# Patient Record
Sex: MALE | Race: WHITE
[De-identification: names, ages, dates, MRNs, and addresses within clinical notes are randomized per-mention and may not be internally consistent; named-entity substitution may affect disease eponyms.]

---

## 2018-07-29 NOTE — DIAGNOSTIC IMAGING REPORT
EXAM: CHEST SINGLE (PORTABLE), AP 1 view

INDICATION: Shortness of breath

COMPARISON: None



FINDINGS:

LINES/TUBES: None



LUNGS: No consolidations or edema. 



PLEURA: No effusions or pneumothorax.



HEART AND MEDIASTINUM: Normal size and contour.



BONES AND SOFT TISSUES: No acute findings. 



IMPRESSION:

No acute thoracic abnormality.







Signed by: Dr. Brandie Conti M.D. on 7/29/2018 10:31 PM

## 2018-07-30 NOTE — PRE OP HISTORY & PHYSICAL
CHIEF COMPLAINT:  Dyspnea and congestion.  



HISTORY OF PRESENT ILLNESS:  Patient is a 63-year-old man.  He has a 

history of COPD and chronic bronchitis.  He uses bronchodilators at home.  

After traveling to work on his house in [place], he noted worsening 

congestion and dyspnea.  He had wheezing.  It was not relieved with 

nebulizers at home.  It was not relieved with prednisone. 



He subsequently came to the emergency department and received Solu-Medrol 

nebulizers.  He had some improvement in the wheezing but still complains of 

dyspnea.  



PAST MEDICAL HISTORY 

1. COPD.

2. Prior cardiac workup 4 years ago showed a normal echocardiogram and a 

normal stress test. 



PAST SURGICAL HISTORY:  History of prior knee surgery.



SOCIAL HISTORY:  The patient is not smoking at this time.  He is not a 

drinker.  



FAMILY HISTORY:  Family history is noncontributory.  



ALLERGIES:  THE PATIENT IS ALLERGIC TO SULFA. 



REVIEW OF SYSTEMS:  There was no fever.  He did not have headache or sinus 

congestion.  He has no sore throat.  There is no neck pain.  He did not 

have chest pain.  He had wheezing and cough as well as dyspnea.  There was 

no abdominal pain.  He had no nausea or vomiting.  He had no leg edema.  



PHYSICAL EXAMINATION 

VITAL SIGNS:  The patient is afebrile.  The vital signs are stable. 

HEENT:  Shows no facial swelling or erythema.  The nasal mucosa is normal.  

The oropharynx is normal.  

LYMPHATIC:  Shows no submandibular, cervical or supraclavicular adenopathy.

CARDIAC:  Reveals a regular rate and rhythm with a normal S1 and S2.  There 

are no murmurs or rubs.

LUNGS:  Auscultation of the lungs reveals prolonged expiratory phase with a 

few wheezes.  

ABDOMEN:  Soft and nontender.  There is no rebound or guarding.

EXTREMITIES:  Show no leg edema or calf tenderness.  There is no cyanosis 

or clubbing.  

SKIN:  Shows no rashes. 

NEUROLOGIC:  Shows no focal abnormalities.  



IMPRESSIONS

1. Chronic obstructive pulmonary disease with acute exacerbation.  

2. Dyspnea. 



PLANS

1. Solu-Medrol at 1 mg per kg along with bronchodilators. 

2. Cardiac evaluation to rule out any cardiac causes of dyspnea. 

3. CT scan of the chest with the pulmonary angiogram protocol to rule 

out PE.  



 









DD:  07/30/2018 08:19

DT:  07/30/2018 08:39

Job#:  [id number] LILI

## 2018-07-30 NOTE — CONSULTATION
DATE OF CONSULTATION:  July 30, 2018 



CARDIOLOGY CONSULTATION



ATTENDING PHYSICIAN:  Dr. Yash Huggins.



Thank you very much for asking me to see this nice man again in 

consultation.



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 

presented to the emergency room with a complaint of shortness of breath.



HISTORY OF PRESENT ILLNESS:  Patient reports he has been described as 

having bronchitis and COPD.  He found his shortness of breath was getting 

worse with cough and some sputum production.  He denies any chest pain or 

palpitations or ankle edema.



PAST MEDICAL HISTORY:  Significant for diagnosis of COPD in recent years.  

He is known to have hyperlipidemia and has had some gout attacks.



CURRENT MEDICATIONS:  Include Synthroid 150 mcg daily for hypothyroidism 

and Protonix.  He has used some steroids intermittently.  He has fish oil 

capsules and inhalers.  He has had previous ER visits for gout.



PERSONAL AND SOCIAL HISTORY:  The patient reports that he finally stopped 

smoking about 4 months ago.



FAMILY HISTORY:  Not relevant.



REVIEW OF SYSTEMS

CARDIAC:  Patient had cardiac evaluation in August 2015 with stress test, 

which was negative and echocardiogram, which showed mild LVH and EF about 

63%.

VASCULAR:  Patient reports he sometimes has some leg cramping.



PHYSICAL EXAMINATION

GENERAL:  At this time shows a pleasant and alert man, who is awake and 

responsive.

VITAL SIGNS:  Blood pressure 140/70.  He is wearing nasal cannula oxygen.

NECK:  No jugular venous distention.

THORAX:  Heart sounds S1 and S2 are equal.  No murmurs. 

LUNGS:  Have faint wheezing and rhonchi that change with cough.

ABDOMEN:  Normal bowel sounds.

EXTREMITIES:  No cyanosis, clubbing, or edema.  Distal pulses are reduced.



LABORATORY AND DIAGNOSTIC DATA:  EKG shows sinus rhythm with sinus 

tachycardia.  Troponins are negative x2.  Today, his glucose is 172.  His 

BNP is less than 10.



Chest x-ray does not show any infiltrates.



ASSESSMENT

1. Bronchitis/chronic obstructive pulmonary disease.

2. Hypothyroidism.

3. Rule out congestive heart failure, although his BNP is less than 10.

4. History of gout.

5. Rule out peripheral vascular disease.





PLAN:  Agree with current management and we will check echocardiogram and 

arterial Doppler scan of the legs and lipid profile.  Further management 

based on clinical course.



Thank you for asking me to see him in consultation.









DD:  07/30/2018 11:23

DT:  07/30/2018 11:49

Job#:  [id number]



cc:DR YASH HOUSTON

## 2018-07-30 NOTE — DIAGNOSTIC IMAGING REPORT
EXAM: CT Chest WITH contrast (PE Protocol)

INDICATION:      

\S\Evaluate for PE

\S\[id number]

\S\1350  

COMPARISON: Chest x-ray on 7/29/2018



TECHNIQUE:

Chest was scanned utilizing a multidetector helical scanner from the lung apex

through the level of the diaphragm after administration of IV contrast. Thin

section reconstructions were obtained with special concentration on the

pulmonary arteries. Coronal and sagittal reformations were obtained. Pulmonary

embolism protocol was performed.

     IV CONTRAST: 100 mL of Isovue-370



COMPLICATIONS: None



RADIATION DOSE: 

     Total DLP: 552.37 mGy*cm

     Estimated effective dose: (DLP x 0.014 x size factor) mSv

     CTDIvol has been reviewed. It is below the limits set by the Radiation

Protocol Committee (RPC).



FINDINGS:



LINES/ TUBES: None.



LUNGS AND AIRWAYS: Suboptimal opacification of the pulmonary arteries. Within

this context, there is no definite central filling defects visualized. Limited

for evaluation of segmental and subsegmental branches. 



3 mm lateral right middle lobe nodule (series 3, image 89).  Airways are

normal.



PLEURA: The pleural spaces are clear. Few focal areas of pleural thickening

versus subsegmental atelectasis, for example in left upper lobe (series 2,

image 31) or posterior right upper lobe (2/34).



HEART AND MEDIASTINUM: The thyroid gland is normal.  No mediastinal, hilar or

axillary lymphadenopathy.  The heart is normal in size.. There is no

pericardial effusion.     .  Main pulmonary artery measures     cm in diameter

and the ascending aorta measures     cm.



UPPER ABDOMEN: Multiple hepatic hypodensities, cannot be characterized on this

single phase study.



BONES: Unremarkable. Degenerative changes of spine.



SOFT TISSUES: Unremarkable.



IMPRESSION: 

1.  Suboptimal opacification of the pulmonary arteries. Within this context,

there is no definite central filling defects visualized. Limited for evaluation

of segmental and subsegmental branches. 

2.  Hepatic hypodensities, which cannot be characterized with certainty on this

single phase study. If clinically indicated, nonurgent right upper quadrant

ultrasound can be obtained for further characterization.

3.  3 mm nonspecific right middle lobe lung nodule. Without risk factors, no

follow-up is necessary. With risk factors, follow-up with low dose chest CT in

one year is recommended.



Signed by: Dr. Stoney Feliz MD on 7/30/2018 2:43 PM

## 2018-09-28 NOTE — HISTORY AND PHYSICAL
CHIEF COMPLAINT:  Worsening dyspnea and wheezing. 



HISTORY OF PRESENT ILLNESS:  The patient is a 63-year-old man with a known 

diagnosis of chronic obstructive pulmonary disease. He uses Spiriva as well 

as nebulizers at home. Apparently they were doing some work in his house 

and removed some tiles. He had difficulty breathing because of the 

increased dust. He noticed worsening wheezing and cough. His cough was 

productive of some discolored phlegm. 



PAST MEDICAL HISTORY

1. COPD.

2. Negative cardiac evaluation.



PAST SURGICAL HISTORY:  Prior knee surgery.



SOCIAL HISTORY:  The patient smoked previously many years ago but is not 

smoking at this time. He is not a drinker.



FAMILY HISTORY:  Family history is noncontributory.



ALLERGIES:  SULFA MEDICATIONS.



REVIEW OF SYSTEMS:  There is no fever. He has no headache. He has no sore 

throat or neck pain. He does not complain of chest pain. He does have 

dyspnea and wheezing. He notes a cough productive of some discolored 

phlegm. 



PHYSICAL EXAMINATION

VITAL SIGNS:  The patient is afebrile. The blood pressure is 153/77, and 

the pulse is 94. The O2 saturation is 93% on 2 liters.

HEENT:  Examination shows no facial swelling or erythema. The nasal mucosa 

is normal. The oropharynx is normal. 

LYMPHATIC:  Examination shows no submandibular, cervical or supraclavicular 

adenopathy.

NECK:  Examination shows no JVD or thyromegaly. There is no nuchal 

rigidity.

CARDIAC:  Exam reveals a regular rate and rhythm with a normal S1 and S2. 

There are no murmurs or rubs.

LUNGS:  Auscultation reveals wheezing in both lung fields. There is a 

prolonged expiratory phase. 

ABDOMEN:  Soft and nontender. There is no rebound or guarding.

EXTREMITIES:  Examination shows no leg edema or calf tenderness. 



RADIOGRAPHIC DATA:  Chest x-ray shows no active disease. 



IMPRESSION

1. Chronic obstructive pulmonary disease with acute exacerbation.

2. Hypothyroidism.



PLAN

1. Solu-Medrol at 1 mg/kg IV daily.

2. Doxycycline twice daily.

3. Bronchodilators.

4. Alpha-1 antitrypsin level.

5. Out of bed as tolerated.









DD:  09/28/2018 13:32

DT:  09/28/2018 13:56

Job#:  [id number]

## 2018-09-28 NOTE — DIAGNOSTIC IMAGING REPORT
PROCEDURE: CHEST SINGLE (PORTABLE)

COMPARISON: None.

INDICATIONS: COUGH,SHORTNESS OF BREATH

 

FINDINGS:



LUNGS: The lungs are hyperexpanded. No consolidations or edema.

 

PLEURA: No effusions or pneumothorax.

 

HEART \T\ 

MEDIASTINUM: The heart is within normal size-limits.

 

BONES \T\

SOFT TISSUES:  No acute findings.

 

 

CONCLUSION:

No acute thoracic abnormality. 

 

 

 

Cameron Chacko D.O.  

Dictated by:  Cameron Chacko D.O. on 9/28/2018 at 9:55     

Electronically approved by:  Cameron Chacko D.O. on 9/28/2018 at 9:55

## 2018-10-01 NOTE — DISCHARGE SUMMARY
DISCHARGE DIAGNOSES

1. Chronic obstructive pulmonary disease with acute exacerbation. 

2. Hypothyroidism.

3. Oral thrush. 



RADIOGRAPHIC DATA:  Chest x-ray shows no active disease. 



HISTORY OF PRESENT ILLNESS:  The patient is a 63-year-old man with a 

history of moderate obstructive lung disease.  He uses Spiriva at home as 

well as nebulizers.  He started having more problems breathing after he was 

removed to the floor in his house.  He noticed increasing coughing and 

wheezing.  It was not relieved with nebulizer.



HOSPITAL COURSE:  Patient was admitted.  The patient was started on IV 

Solu-Medrol along with antibiotics.  Patient also received aggressive 

bronchodilators.  Patient had some gradual improvement with this.  After 2 

to 3 days he felt much better and was eager to go home.

The patient is also experiencing some oral thrush that started on the day 

of discharge.  He does not report any acid reflux or heartburn.  



DISPOSITION:  The patient is discharged home and will follow up with Carlota Huggins and Johan.  

 



 _________________________________

RIZWAN HUGGINS MD



DD:  10/01/2018 12:19

DT:  10/01/2018 15:33

Job#:  [id number]



cc:ZAFAR HOUSTON MD

## 2019-02-20 ENCOUNTER — HOSPITAL ENCOUNTER (EMERGENCY)
Dept: HOSPITAL 88 - FSED | Age: 64
Discharge: HOME | End: 2019-02-20
Payer: COMMERCIAL

## 2019-02-20 VITALS — WEIGHT: 228 LBS | BODY MASS INDEX: 28.35 KG/M2 | HEIGHT: 75 IN

## 2019-02-20 VITALS — SYSTOLIC BLOOD PRESSURE: 155 MMHG | DIASTOLIC BLOOD PRESSURE: 94 MMHG

## 2019-02-20 DIAGNOSIS — Z87.891: ICD-10-CM

## 2019-02-20 DIAGNOSIS — R06.09: Primary | ICD-10-CM

## 2019-02-20 DIAGNOSIS — R05: ICD-10-CM

## 2019-02-20 DIAGNOSIS — J44.1: ICD-10-CM

## 2019-02-20 PROCEDURE — 80053 COMPREHEN METABOLIC PANEL: CPT

## 2019-02-20 PROCEDURE — 84484 ASSAY OF TROPONIN QUANT: CPT

## 2019-02-20 PROCEDURE — 85025 COMPLETE CBC W/AUTO DIFF WBC: CPT

## 2019-02-20 PROCEDURE — 87400 INFLUENZA A/B EACH AG IA: CPT

## 2019-02-20 PROCEDURE — 93005 ELECTROCARDIOGRAM TRACING: CPT

## 2019-02-20 PROCEDURE — 71046 X-RAY EXAM CHEST 2 VIEWS: CPT

## 2019-02-20 PROCEDURE — 99284 EMERGENCY DEPT VISIT MOD MDM: CPT

## 2019-02-20 NOTE — DIAGNOSTIC IMAGING REPORT
EXAM: CXR 2 VIEW - HOPD, PA and lateral

DATE: 2/20/2019 Time stamp on exam: 10:53 AM

INDICATION: Congestion

COMPARISON: None



FINDINGS:

LINES/TUBES: None



LUNGS: No consolidations or edema. 



PLEURA: No effusions or pneumothorax.



HEART AND MEDIASTINUM: Normal size and contour. Tortuous thoracic aorta. 



BONES AND SOFT TISSUES: No acute findings with mild degenerative changes of the

spine. 



IMPRESSION:

No acute thoracic abnormality.











Signed by: Dr. Cameron Chacko DO on 2/20/2019 11:10 AM

## 2019-02-20 NOTE — XMS REPORT
[facility] - Continuity of Care Document

                             Created on: 2019



LOLA PAYNE

External Reference #: [id number]

: 1955

Sex: Male



Demographics







                          Address                   [address]

[place]  [address]

 

                          Home Phone                [phone]

 

                          Preferred Language        English

 

                          Marital Status            

 

                          Jew Affiliation     Unknown

 

                          Race                      White

 

                          Ethnic Group              Non-





Author







                          Author                    [facility]

 

                          Organization              [facility]

 

                          Address                   [address] [place]  [address]



 

                          Phone                     [phone];ext=







Support







                Name            Relationship    Address         Phone

 

                DEIDRA PAYNE      Next Of Kin     Unknown         [phone]

 

                DEIDRA PAYNE      ECON            Unknown         [phone]







Care Team Providers







                    Care Team Member Name    Role                Phone

 

                    LORI FRANKLIN          Admphys             [phone]

 

                    LORI FRANKLIN          Attphys             [phone]







Hospital Admission Diagnosis







                    Code                Admission Diagnosis    Date

 

                    85361115            Cough                







Social History







           Element Description    Code       Description    Smoking Status Code System    Start Date    

End Date

 

           Smoking Status    692529875    Never smoker    SNOMED-CT                







Problems

* No data in the system





Medications







        RxNorm    Medication    Dose    Route    Instructions    Indications    Start Date    End Date

                                        Status

 

                756496          Acetaminophen 300 MG / Codeine Phosphate 60 MG Oral Tablet    1 tablet        oral

             orally every 4 to 6 hours as needed. (as needed for pain)                                           Active

 

             062595       cefdinir 300 MG Oral Capsule    300 milligram    oral         orally every 12 hours

 (10 days)                                                      Active

 

             135560       Methylprednisolone 4 MG Oral Tablet    1 package    oral         orally Per package

 directions                                                     Active

 

             8640         Prednisone    40 milligram    oral         orally every day (5 days) (administer with

 food or milk)                                                    Active







Allergies







        Code    Code System    Allergy Substance    Type    Reaction    Severity    Start Date    End 

Date                                    Status

 

        67301    RXNorm    Sulfa(Sulfonamide Antibiotics)    Drug allergy            Unknown                    Active









Results





******** Radiology Results ********







Order: [id number] ED2 XR CHEST 2 PA LATERAL* Exam Completion Date:2019 07:47





Procedure:  ED2 XR CHEST 2 PA LATERAL        Order  Date:  2019 7:47 AMOrde
ring Provider:  LORI Leslieinical Indication:  [id number]: CoughComparison: NoneFi
ndings: Cardiac size is normal. Pulmonary vasculature is normal.Mediastinal cont
our is normal. Aortic contour is normal. There is no consolidation or effusion. 
There is no mass or pneumothorax.There is no evidence of active tuberculosis.The
re is thoracic dextroscoliosis.Impression: 1. No acute abnormality in the chest.
2. Thoracic scoliosis.3. Exam is otherwise negative.This final report was electr
onically signed by Dr Bob Crawley MD  [id number] AMDictated By: TRISH CRAWLEYDate:  2019 07:56







Vital Signs







                    Vitals              Value               Date

 

                    Body Temperature    98.7 F              2019

 

                    Pulse Rate          90 (beats)/min      2019

 

                    Respiratory Rate    18 (breaths)/min    2019

 

                    O2% BldC Oximetry    99 %                2019

 

                    BP Systolic         151 mmHg            2019

 

                    BP Diastolic        82 mmHg             2019

 

                    Height              63 in               2019

 

                    Weight Measured     229.28 lbs          2019

 

                    BSA (Body Surface Area)    2.82760 m2          2019

 

                    BMI (Body Mass Index)    40.6 kg/m2          2019







Advance Directives





Patient does NOT have Living Will





             Directive Type    Effective Date         Notes        Supporting Document

 

                    Name                Address             Phone

 

             No Directive Type specified    2016 14:09    Not Specified    Not Specified    Not

 Specified                None                      No







Family History

* No Data Reported





Plan of Care

* No data in the system





Procedures







             Code         Code System    Procedure Name    Target Site    Date of Procedure

 

                                       ED2 XR CHEST 2 PA LATERAL                 2019 08:03







Encounters







                Date            Code            Diagnosis       Status

 

                                (ICD10) - J440    COPD W/ACUTE LOWER RESPIRATORY INF    Active







Immunizations

* No data in the system





Functional Status

* No data in the system





Hospital Discharge Instructions

* Discharge Instructions 2* Discharge Diagnosis* Chronic Bronchitis



* Important Information* Consult your physician or return to the Emergency 
  Department immediately if worse, if not better as expected, or if any problems
  arise.



* Follow Up Care* Yes



* Important Information* Please understand that you have received care only on 
  an emergency basis. If your condition does not improve, you should call your 
  personal physician for follow-up care. If you do not have a physician, you may
  call the referred physician listed.

* If you have questions about your care or these discharge instructions, you may
  call the Emergency Department.  Please take your discharge paperwork with you 
  to any follow-up appointments.



* Follow-Up With:* Primary Care Physician



* Diet* Regular



* Prescriptions Given Via:* Printed and given to patient/caregiver.



* Patient Teaching* Patient education provided

## 2019-06-02 ENCOUNTER — HOSPITAL ENCOUNTER (EMERGENCY)
Dept: HOSPITAL 88 - FSED | Age: 64
Discharge: HOME | End: 2019-06-02
Payer: COMMERCIAL

## 2019-06-02 VITALS — DIASTOLIC BLOOD PRESSURE: 68 MMHG | SYSTOLIC BLOOD PRESSURE: 134 MMHG

## 2019-06-02 VITALS — BODY MASS INDEX: 28.4 KG/M2 | HEIGHT: 74 IN | WEIGHT: 221.25 LBS

## 2019-06-02 DIAGNOSIS — J44.1: ICD-10-CM

## 2019-06-02 DIAGNOSIS — R06.00: Primary | ICD-10-CM

## 2019-06-02 PROCEDURE — 99283 EMERGENCY DEPT VISIT LOW MDM: CPT

## 2019-06-02 NOTE — XMS REPORT
[facility] - Continuity of Care Document

                             Created on: 2019



LOLA PAYNE

External Reference #: [id number]

: 1955

Sex: Male



Demographics







                          Address                   [address]

[place]  [address]

 

                          Home Phone                [phone]

 

                          Preferred Language        English

 

                          Marital Status            

 

                          Yarsanism Affiliation     Unknown

 

                          Race                      White

 

                          Ethnic Group              Non-





Author







                          Author                    [facility]

 

                          Organization              [facility]

 

                          Address                   [address] [place]  [address]



 

                          Phone                     [phone];ext=







Support







                Name            Relationship    Address         Phone

 

                DEIDRA PAYNE            Unknown         [phone]







Care Team Providers







                    Care Team Member Name    Role                Phone

 

                    HILARIO CARO     Admphys             Unavailable

 

                    HILARIO CARO     [employer]             Unavailable







Hospital Admission Diagnosis







                    Code                Admission Diagnosis    Date

 

                    [id number]           Dyspnea              







Social History







           Element Description    Code       Description    Smoking Status Code System    Start Date    

End Date

 

           Smoking Status    7488596    Former smoker    SNOMED-CT                







Problems

* No data in the system





Medications







        RxNorm    Medication    Dose    Route    Instructions    Indications    Start Date    End Date

                                        Status

 

                539037          Acetaminophen 300 MG / Codeine Phosphate 60 MG Oral Tablet    1 tablet        oral

             orally every 4 to 6 hours as needed. (as needed for pain)                                           Active

 

             334901       cefdinir 300 MG Oral Capsule    300 milligram    oral         orally every 12 hours

 (10 days)                                                      Active

 

             974290       cefdinir 300 MG Oral Capsule    300 milligram    oral         orally every 12 hours

                                                                Active

 

             351663       Methylprednisolone 4 MG Oral Tablet    1 package    oral         orally Per package

 directions                                                     Active

 

             8640         Prednisone    40 milligram    oral         orally every day (administer with food or 

milk)                                                           Active

 

             8640         Prednisone    40 milligram    oral         orally every day (5 days) (administer with

 food or milk)                                                    Active







Allergies







        Code    Code System    Allergy Substance    Type    Reaction    Severity    Start Date    End 

Date                                    Status

 

        34844    RXNorm    Sulfa(Sulfonamide Antibiotics)    Drug allergy            Unknown                    Active









Results





******** Radiology Results ********







Order: [id number] ED2 XR CHEST 2 PA LATERAL* Exam Completion Date:05/10/2019 09:00





Procedure:  ED2 XR CHEST 2 PA LATERAL         Order  Date:  5/10/2019 9:00 AMOrd
ering Provider:  HILARIO CAROClinical Indication:  [id number]: CoughComparison: 
2019Findings: Cardiac size is normal. Pulmonary vasculature is normal.
Mediastinal contour is normal. Aortic contour is normal. Mild elevation of the l
eft hemidiaphragm.There is no consolidation or effusion. There is no mass or pne
umothorax.There is no evidence of active tuberculosis.There is no skeletal abnor
mality.Impression:1. No consolidation or effusion.2. Mild elevation of left rosemary
diaphragm.3. No other significant findings.This final report was electronically 
signed by Dr Bob Crawley MD  5/10/49986:38 AMDictated By: BOB CRAWLEY.Date
:  05/10/2019 09:38







Vital Signs







                    Vitals              Value               Date

 

                    Pulse Rate          93 (beats)/min      05/10/2019

 

                    Respiratory Rate    14 (breaths)/min    05/10/2019

 

                    O2% BldC Oximetry    99 %                05/10/2019

 

                    BP Systolic         130 mmHg            05/10/2019

 

                    BP Diastolic        85 mmHg             05/10/2019

 

                    Body Temperature    98.5 F              05/10/2019

 

                    Height              75 in               05/10/2019

 

                    Weight Measured     221.01 lbs          05/10/2019

 

                    BSA (Body Surface Area)    2.21627 m2          05/10/2019

 

                    BMI (Body Mass Index)    27.7 kg/m2          05/10/2019







Advance Directives





Patient does NOT have Living Will





             Directive Type    Effective Date         Notes        Supporting Document

 

                    Name                Address             Phone

 

             No Directive Type specified    2016 14:09    Not Specified    Not Specified    Not

 Specified                None                      No







Family History

* No Data Reported





Plan of Care

* No data in the system





Procedures







             Code         Code System    Procedure Name    Target Site    Date of Procedure

 

                                       ED2 XR CHEST 2 PA LATERAL                 05/10/2019 09:45







Encounters







                Date            Code            Diagnosis       Status

 

                                (ICD10) - J441    COPD WITH ACUTE EXACERBATION    Active







Immunizations

* No data in the system





Functional Status

* No data in the system





Hospital Discharge Instructions

* Discharge Instructions 2* Discharge Diagnosis* COPD Exac



* Important Information* Consult your physician or return to the Emergency 
  Department immediately if worse, if not better as expected, or if any problems
  arise.



* Follow Up Care* Yes



* Important Information* Please understand that you have received care only on 
  an emergency basis. If your condition does not improve, you should call your 
  personal physician for follow-up care. If you do not have a physician, you may
  call the referred physician listed.

* If you have questions about your care or these discharge instructions, you may
  call the Emergency Department.  Please take your discharge paperwork with you 
  to any follow-up appointments.



* Follow-Up With:* Primary Care Physician



* Activity Level* As tolerated, unrestricted



* Diet* Regular



* Prescriptions Given Via:* Printed and given to patient/caregiver.



* Patient Teaching* Patient education provided